# Patient Record
Sex: FEMALE | Race: BLACK OR AFRICAN AMERICAN | HISPANIC OR LATINO | URBAN - METROPOLITAN AREA
[De-identification: names, ages, dates, MRNs, and addresses within clinical notes are randomized per-mention and may not be internally consistent; named-entity substitution may affect disease eponyms.]

---

## 2022-07-07 ENCOUNTER — APPOINTMENT (RX ONLY)
Dept: URBAN - METROPOLITAN AREA CLINIC 57 | Facility: CLINIC | Age: 52
Setting detail: DERMATOLOGY
End: 2022-07-07

## 2022-07-07 DIAGNOSIS — Z41.9 ENCOUNTER FOR PROCEDURE FOR PURPOSES OTHER THAN REMEDYING HEALTH STATE, UNSPECIFIED: ICD-10-CM

## 2022-07-07 PROCEDURE — ? COSMETIC CONSULTATION - MICRO-NEEDLING

## 2022-07-07 PROCEDURE — ? OTHER

## 2022-07-07 PROCEDURE — ? COSMETIC CONSULTATION: INMODE MORPHEUS 8

## 2022-07-07 PROCEDURE — ? COSMETIC CONSULTATION: FRACTIONAL RESURFACING

## 2022-07-07 ASSESSMENT — LOCATION DETAILED DESCRIPTION DERM
LOCATION DETAILED: LEFT INFERIOR CENTRAL MALAR CHEEK
LOCATION DETAILED: RIGHT MEDIAL BUCCAL CHEEK
LOCATION DETAILED: LEFT FOREHEAD
LOCATION DETAILED: RIGHT INFERIOR CENTRAL MALAR CHEEK
LOCATION DETAILED: RIGHT MEDIAL FOREHEAD
LOCATION DETAILED: LEFT CENTRAL BUCCAL CHEEK

## 2022-07-07 ASSESSMENT — LOCATION SIMPLE DESCRIPTION DERM
LOCATION SIMPLE: RIGHT FOREHEAD
LOCATION SIMPLE: RIGHT CHEEK
LOCATION SIMPLE: LEFT FOREHEAD
LOCATION SIMPLE: LEFT CHEEK

## 2022-07-07 ASSESSMENT — LOCATION ZONE DERM: LOCATION ZONE: FACE

## 2022-07-07 NOTE — PROCEDURE: MIPS QUALITY
Quality 130: Documentation Of Current Medications In The Medical Record: Current Medications with Name, Dosage, Frequency, or Route not Documented, Reason not Given
Quality 431: Preventive Care And Screening: Unhealthy Alcohol Use - Screening: Patient did not receive brief counseling if identified as an unhealthy alcohol user, reason not given
Detail Level: Detailed
Quality 226: Preventive Care And Screening: Tobacco Use: Screening And Cessation Intervention: Tobacco Screening not Performed for Medical Reasons

## 2022-07-07 NOTE — PROCEDURE: OTHER
Other (Free Text): Recommended \\n- UV facial SPF\\n- discoloration defense from skinceuticals \\n- clarifying skin cleanser from [employer]inceuticals \\n- vitamin C serum from [employer]icals
Detail Level: Zone
Note Text (......Xxx Chief Complaint.): This diagnosis correlates with the tinea versicolor
Render Risk Assessment In Note?: no
Other (Free Text): Prescribed hydroquinone-tretinoin 6-0.025% cream QHS
Other (Free Text): Patient paid $150 for todayâs consultation

## 2022-07-08 ENCOUNTER — RX ONLY (OUTPATIENT)
Age: 52
Setting detail: RX ONLY
End: 2022-07-08

## 2022-07-08 RX ORDER — H-QUINONE/TRETINOIN/HYDROCORT 6 %-0.025%
EMULSION (GRAM) TOPICAL
Qty: 30 | Refills: 2 | Status: ERX | COMMUNITY
Start: 2022-07-07